# Patient Record
Sex: FEMALE | Race: WHITE | Employment: FULL TIME | URBAN - METROPOLITAN AREA
[De-identification: names, ages, dates, MRNs, and addresses within clinical notes are randomized per-mention and may not be internally consistent; named-entity substitution may affect disease eponyms.]

---

## 2020-08-31 ENCOUNTER — OFFICE VISIT (OUTPATIENT)
Dept: CARDIOLOGY CLINIC | Age: 60
End: 2020-08-31
Payer: COMMERCIAL

## 2020-08-31 VITALS
WEIGHT: 158 LBS | DIASTOLIC BLOOD PRESSURE: 72 MMHG | HEART RATE: 72 BPM | HEIGHT: 63 IN | SYSTOLIC BLOOD PRESSURE: 128 MMHG | BODY MASS INDEX: 28 KG/M2

## 2020-08-31 DIAGNOSIS — Z72.0 TOBACCO USE: ICD-10-CM

## 2020-08-31 DIAGNOSIS — R00.2 HEART PALPITATIONS: Primary | ICD-10-CM

## 2020-08-31 DIAGNOSIS — E78.2 MIXED HYPERLIPIDEMIA: ICD-10-CM

## 2020-08-31 DIAGNOSIS — Z82.49 FAMILY HISTORY OF EARLY CAD: ICD-10-CM

## 2020-08-31 PROCEDURE — 99204 OFFICE O/P NEW MOD 45 MIN: CPT | Performed by: INTERNAL MEDICINE

## 2020-08-31 RX ORDER — LISINOPRIL AND HYDROCHLOROTHIAZIDE 20; 25 MG/1; MG/1
TABLET ORAL
COMMUNITY
Start: 2020-08-15 | End: 2021-01-27

## 2020-08-31 RX ORDER — VITAMIN E 268 MG
CAPSULE ORAL DAILY
COMMUNITY
End: 2021-03-03

## 2020-08-31 RX ORDER — DIAPER,BRIEF,INFANT-TODD,DISP
EACH MISCELLANEOUS
COMMUNITY

## 2020-08-31 RX ORDER — ATORVASTATIN CALCIUM 40 MG/1
TABLET, FILM COATED ORAL
COMMUNITY
Start: 2020-08-15

## 2020-08-31 NOTE — PATIENT INSTRUCTIONS
A 30 day loop monitor has been ordered for you. This will be mailed to the address given to us. Please read over the instructions given to you about [employer] loop monitors. If you have any insurance questions regarding coverage and out of pocket cost please contact the number below. If you have any billing questions regarding deductible or responsibility call ([phone]) If you need additional supplies or issue with your monitor please call ([phone])

## 2020-08-31 NOTE — PROGRESS NOTES
[address]: [place]  [phone]    History of Present Illness:  Ms. Lauren Cameron is a 63 yo F with a history of tobacco use, family history of early coronary artery disease, toxemia with pregnancy, referred by Dr. July Roman for cardiac evaluation. She is here due to recent palpitations. Initially about a month ago they were occurring almost daily lasting for minutes to hours at a time, occurring with no particular triggers. She denies any exertional chest pain. She does have occasional aching in her left arm, but this is nonexertional.  Otherwise, breathing has been stable as well. She does note that the palpitations have become less frequent, now just occurring a few times a week and more brief. Last occurred while watching tv. She is compensated on exam.  Soc hx. Tobacco 1/2 ppd. Chantix in past.  Fam hx. GF paternal 54 CAD, father CABG/MI. Assessment and Plan:    1. Palpitations. Will obtain a one month loop monitor and echocardiogram for further evaluation. 2. Family history of early coronary artery disease. 3. Mixed hyperlipidemia. 4. Tobacco use. Encouraged cessation. She has had some success with Chantix in the past, but also had a reaction when she was taking this concurrent to an antibiotic. 5. Mixed hyperlipidemia. Tolerating statin. She  has a past medical history of Hyperlipemia, Hypertension, and Preeclampsia. All other systems negative except as above. PE  Vitals:    08/31/20 0856   BP: 128/72   Pulse: 72   Weight: 158 lb (71.7 kg)   Height: 5' 3\" (1.6 m)    Body mass index is 27.99 kg/m².    General appearance - alert, well appearing, and in no distress  Mental status - affect appropriate to mood  Eyes - sclera anicteric, moist mucous membranes  Neck - supple, no JVD  Chest - clear to auscultation, no wheezes, rales or rhonchi  Heart - normal rate, regular rhythm, normal S1, S2, no murmurs, rubs, clicks or gallops  Abdomen - soft, nontender, nondistended, no masses or organomegaly  Neurological no focal deficit  Extremities - peripheral pulses normal, no pedal edema      Recent Labs:  No results found for: CHOL, CHOLX, CHLST, CHOLV, 910433, HDL, HDLP, LDL, LDLC, DLDLP, TGLX, TRIGL, TRIGP, CHHD, CHHDX  No results found for: TYLER, CREAPOC, ACREA, CREA, REFC3, REFC4  No results found for: BUN, BUNPOC, IBUN, MBUNV, BUNV  No results found for: K, KI, PLK, WBK  No results found for: HBA1C, HGBE8, XDD7NNZV, SJZ1XXXM  No results found for: HGBPOC, HGB, HGBP, HGBEXT, HGBEXT  No results found for: PLT, PLTEXT, PLTEXT    Reviewed:  Past Medical History:   Diagnosis Date    Hyperlipemia     Hypertension     Preeclampsia      Social History     Tobacco Use   Smoking Status Former Smoker   Smokeless Tobacco Never Used     Social History     Substance and Sexual Activity   Alcohol Use Yes    Alcohol/week: 3.3 standard drinks    Types: 4 Glasses of wine per week     Allergies   Allergen Reactions    Azithromycin Hives and Rash    Zocor [Simvastatin] Other (comments)     Heart palpitations       Current Outpatient Medications   Medication Sig    lisinopril-hydroCHLOROthiazide (PRINZIDE, ZESTORETIC) 20-25 mg per tablet TAKE 1 TABLET BY MOUTH EVERY DAY    atorvastatin (LIPITOR) 40 mg tablet TAKE 1 TABLET BY MOUTH EVERY DAY    biotin 10,000 mcg cap Take  by mouth.  vitamin E (AQUA GEMS) 400 unit capsule Take  by mouth daily.  cholecalciferol, vitamin D3, (VITAMIN D3 PO) Take  by mouth.  ibuprofen (MOTRIN) 600 mg tablet Take 1 Tab by mouth every six (6) hours as needed for Pain.  ATORVASTATIN CALCIUM (LIPITOR PO) Take  by mouth.  LISINOPRIL PO Take  by mouth.  HYDROcodone-acetaminophen (NORCO) 7.5-325 mg per tablet Take 1 Tab by mouth every six (6) hours as needed for Pain. No current facility-administered medications for this visit.         Lary Trinh MD  [facility] heart and Vascular [place]  [address], [address] [facility] [address]  [facility], [address]

## 2020-08-31 NOTE — LETTER
9/1/2020 5:37 PM 
 
Patient:  Anca Barrios YOB: 1960 Date of Visit: 8/31/2020 Dear Stephanie Lopez MD 
[address] [place] [id number] VIA Facsimile: [phone]: Thank you for referring Ms. Anca Barrios to me for evaluation/treatment. Below are the relevant portions of my assessment and plan of care. Ms. Dion Srpinger is a 63 yo F with a history of tobacco use, family history of early coronary artery disease, toxemia with pregnancy, referred by Dr. Jhon Lentz for cardiac evaluation. She is here due to recent palpitations. Initially about a month ago they were occurring almost daily lasting for minutes to hours at a time, occurring with no particular triggers. She denies any exertional chest pain. She does have occasional aching in her left arm, but this is nonexertional.  Otherwise, breathing has been stable as well. She does note that the palpitations have become less frequent, now just occurring a few times a week and more brief. Last occurred while watching tv. She is compensated on exam. 
Soc hx. Tobacco 1/2 ppd. Chantix in past. 
Fam hx. GF paternal 54 CAD, father CABG/MI. Assessment and Plan: 1. Palpitations. Will obtain a one month loop monitor and echocardiogram for further evaluation. 2. Family history of early coronary artery disease. 3. Mixed hyperlipidemia. 4. Tobacco use. Encouraged cessation. She has had some success with Chantix in the past, but also had a reaction when she was taking this concurrent to an antibiotic. 5. Mixed hyperlipidemia. Tolerating statin. If you have questions, please do not hesitate to call me. Sincerely, Anum Perea MD

## 2020-09-18 ENCOUNTER — ANCILLARY PROCEDURE (OUTPATIENT)
Dept: CARDIOLOGY CLINIC | Age: 60
End: 2020-09-18
Payer: COMMERCIAL

## 2020-09-18 VITALS
SYSTOLIC BLOOD PRESSURE: 128 MMHG | HEIGHT: 63 IN | BODY MASS INDEX: 28 KG/M2 | DIASTOLIC BLOOD PRESSURE: 72 MMHG | WEIGHT: 158 LBS

## 2020-09-18 DIAGNOSIS — R00.2 HEART PALPITATIONS: ICD-10-CM

## 2020-09-18 DIAGNOSIS — Z82.49 FAMILY HISTORY OF EARLY CAD: ICD-10-CM

## 2020-09-18 DIAGNOSIS — Z72.0 TOBACCO USE: ICD-10-CM

## 2020-09-18 DIAGNOSIS — E78.2 MIXED HYPERLIPIDEMIA: ICD-10-CM

## 2020-09-18 LAB
ECHO AO ASC DIAM: 3.21 CM
ECHO AO ROOT DIAM: 2.63 CM
ECHO AV AREA PEAK VELOCITY: 1.89 CM2
ECHO AV AREA VTI: 2 CM2
ECHO AV AREA/BSA PEAK VELOCITY: 1.1 CM2/M2
ECHO AV AREA/BSA VTI: 1.1 CM2/M2
ECHO AV MEAN GRADIENT: 6.96 MMHG
ECHO AV MEAN VELOCITY: 123.15 CM/S
ECHO AV PEAK GRADIENT: 13.73 MMHG
ECHO AV PEAK VELOCITY: 185.27 CM/S
ECHO AV VTI: 36.21 CM
ECHO IVC PROX: 1.07 CM
ECHO LA AREA 4C: 13.58 CM2
ECHO LA MAJOR AXIS: 3.3 CM
ECHO LA MINOR AXIS: 1.89 CM
ECHO LA VOL 2C: 38.97 ML (ref 22–52)
ECHO LA VOL 4C: 32.43 ML (ref 22–52)
ECHO LA VOL BP: 39.16 ML (ref 22–52)
ECHO LA VOL/BSA BIPLANE: 22.38 ML/M2 (ref 16–28)
ECHO LA VOLUME INDEX A2C: 22.28 ML/M2 (ref 16–28)
ECHO LA VOLUME INDEX A4C: 18.54 ML/M2 (ref 16–28)
ECHO LV E' LATERAL VELOCITY: 9.42 CM/S
ECHO LV E' SEPTAL VELOCITY: 8.02 CM/S
ECHO LV EDV A2C: 91.83 ML
ECHO LV EDV A4C: 72.4 ML
ECHO LV EDV BP: 83.35 ML (ref 56–104)
ECHO LV EDV INDEX A4C: 41.4 ML/M2
ECHO LV EDV INDEX BP: 47.6 ML/M2
ECHO LV EDV NDEX A2C: 52.5 ML/M2
ECHO LV EJECTION FRACTION A2C: 70 PERCENT
ECHO LV EJECTION FRACTION A4C: 55 PERCENT
ECHO LV EJECTION FRACTION BIPLANE: 64 PERCENT (ref 55–100)
ECHO LV ESV A2C: 27.43 ML
ECHO LV ESV A4C: 32.74 ML
ECHO LV ESV BP: 30.05 ML (ref 19–49)
ECHO LV ESV INDEX A2C: 15.7 ML/M2
ECHO LV ESV INDEX A4C: 18.7 ML/M2
ECHO LV ESV INDEX BP: 17.2 ML/M2
ECHO LV INTERNAL DIMENSION DIASTOLIC: 3.36 CM (ref 3.9–5.3)
ECHO LV INTERNAL DIMENSION SYSTOLIC: 2.41 CM
ECHO LV IVSD: 1.03 CM (ref 0.6–0.9)
ECHO LV MASS 2D: 95.7 G (ref 67–162)
ECHO LV MASS INDEX 2D: 54.7 G/M2 (ref 43–95)
ECHO LV POSTERIOR WALL DIASTOLIC: 0.95 CM (ref 0.6–0.9)
ECHO LVOT DIAM: 1.95 CM
ECHO LVOT PEAK GRADIENT: 5.53 MMHG
ECHO LVOT PEAK VELOCITY: 117.54 CM/S
ECHO LVOT SV: 72.6 ML
ECHO LVOT VTI: 24.4 CM
ECHO MV A VELOCITY: 88.31 CM/S
ECHO MV AREA PHT: 4.46 CM2
ECHO MV E DECELERATION TIME (DT): 0.17 S
ECHO MV E VELOCITY: 100.19 CM/S
ECHO MV E/A RATIO: 1.13
ECHO MV E/E' LATERAL: 10.64
ECHO MV E/E' RATIO (AVERAGED): 11.56
ECHO MV E/E' SEPTAL: 12.49
ECHO MV PRESSURE HALF TIME (PHT): 0.05 S
ECHO RA MAJOR AXIS: 2.89 CM
ECHO RV INTERNAL DIMENSION: 2.71 CM
ECHO RV TAPSE: 1.7 CM (ref 1.5–2)
LA VOL DISK BP: 36.86 ML (ref 22–52)
LVOT MG: 2.76 MMHG

## 2020-09-18 PROCEDURE — 93306 TTE W/DOPPLER COMPLETE: CPT | Performed by: INTERNAL MEDICINE

## 2020-10-07 ENCOUNTER — TELEPHONE (OUTPATIENT)
Dept: CARDIOLOGY CLINIC | Age: 60
End: 2020-10-07

## 2020-10-07 NOTE — TELEPHONE ENCOUNTER
Patient returned called to patient. Two patient indentifiers verified. Pt was given results. Pt stated she doesn't want to start a medication at this time. Pt will call back if symptoms increases and wants to start medications. Pt verbalized understanding and denies any further questions at this time.

## 2020-10-07 NOTE — TELEPHONE ENCOUNTER
----- Message from Awilda Reese MD sent at 10/7/2020  8:39 AM EDT -----  Please let pt know loop demonstrated fast heart beats (SVT) associated with her palpitations. These were brief last a few seconds. No therapy is needed for this. Can take toprol 25 mg once daily for symptom relief. If she does start toprol, follow up in 1 month after.  thx

## 2021-01-04 ENCOUNTER — TELEPHONE (OUTPATIENT)
Dept: CARDIOLOGY CLINIC | Age: 61
End: 2021-01-04

## 2021-01-04 RX ORDER — METOPROLOL SUCCINATE 25 MG/1
25 TABLET, EXTENDED RELEASE ORAL DAILY
Qty: 30 TAB | Refills: 1 | Status: SHIPPED | OUTPATIENT
Start: 2021-01-04 | End: 2021-01-27 | Stop reason: SDUPTHER

## 2021-01-04 NOTE — TELEPHONE ENCOUNTER
Patient calling in regards to heart palpitations. States she was told to call once they have increased. States shortness of breath and chest tightness have been waking her up at night and have increased.     Phone: [phone]

## 2021-01-04 NOTE — TELEPHONE ENCOUNTER
Returned call to patient. Two patient indentifiers verified. Per notw on 10/7/2020 patient doesn't wasn't to start medications. Pt still having palps will start toprol 25 mg daily and follow up in 1 month. Pt verbalized understanding and denies any further questions at this time.      Future Appointments   Date Time Provider Maryam Cristobal   1/27/2021 10:40 AM MD EMELINA Arevalo AMB

## 2021-01-27 ENCOUNTER — OFFICE VISIT (OUTPATIENT)
Dept: CARDIOLOGY CLINIC | Age: 61
End: 2021-01-27
Payer: COMMERCIAL

## 2021-01-27 VITALS
HEART RATE: 67 BPM | BODY MASS INDEX: 29.77 KG/M2 | SYSTOLIC BLOOD PRESSURE: 130 MMHG | RESPIRATION RATE: 18 BRPM | DIASTOLIC BLOOD PRESSURE: 70 MMHG | HEIGHT: 63 IN | OXYGEN SATURATION: 97 % | WEIGHT: 168 LBS

## 2021-01-27 DIAGNOSIS — Z82.49 FAMILY HISTORY OF EARLY CAD: ICD-10-CM

## 2021-01-27 DIAGNOSIS — E78.2 MIXED HYPERLIPIDEMIA: ICD-10-CM

## 2021-01-27 DIAGNOSIS — I47.1 PAROXYSMAL SVT (SUPRAVENTRICULAR TACHYCARDIA) (HCC): Primary | ICD-10-CM

## 2021-01-27 DIAGNOSIS — Z72.0 TOBACCO USE: ICD-10-CM

## 2021-01-27 PROCEDURE — 93000 ELECTROCARDIOGRAM COMPLETE: CPT | Performed by: INTERNAL MEDICINE

## 2021-01-27 PROCEDURE — 99214 OFFICE O/P EST MOD 30 MIN: CPT | Performed by: INTERNAL MEDICINE

## 2021-01-27 RX ORDER — DILTIAZEM HYDROCHLORIDE 120 MG/1
120 CAPSULE, COATED, EXTENDED RELEASE ORAL DAILY
Qty: 30 CAP | Refills: 5 | Status: SHIPPED | OUTPATIENT
Start: 2021-01-27 | End: 2021-03-03

## 2021-01-27 RX ORDER — METOPROLOL SUCCINATE 25 MG/1
25 TABLET, EXTENDED RELEASE ORAL DAILY
Qty: 90 TAB | Refills: 1 | Status: SHIPPED | OUTPATIENT
Start: 2021-01-27 | End: 2021-03-03

## 2021-01-27 NOTE — PROGRESS NOTES
[address]: [place]  [phone]    History of Present Illness:  Ms. Alaina Easton is a 63 yo F with a history of tobacco use, family history of early coronary artery disease, toxemia with pregnancy. Echo was normal. Loop demonstrated brief episodes of SVT. On her last visit due to palpitations, had her wear a loop monitor that demonstrated episodes of SVT. Initially, she tried to hold off on medication, but continued to have palpitations and started Toprol 25 mg a few weeks ago. She notes that she continues to have almost daily palpitations and did not seem to help very much. When she is not having these episodes, her breathing has been stable and no chest pain. Often times, she will wake up with the palpitations, sometimes after eating. She also had an echocardiogram that was overall normal and we discussed this. She is compensated on exam with clear lungs and no lower extremity edema. Blood pressure is 130/70 and heart rate of 67. Soc hx. Tobacco 1/2 ppd. Chantix in past.  Fam hx. GF paternal 54 CAD, father CABG/MI. Assessment and Plan:    1. Paroxysmal supraventricular tachycardia. She is having frequent symptoms and will replace Lisinopril with Diltiazem. She will continue with Toprol. Will have her follow back in one month. We did talk briefly about possibly ablation if this was unable to be controlled with medication. Her EKG today is normal sinus rhythm with single PVC. 2. Mixed hyperlipidemia. Tolerating statin. 3. Family history of early coronary artery disease. 4. Tobacco use. Encouraged cessation. She  has a past medical history of Hyperlipemia, Hypertension, and Preeclampsia. All other systems negative except as above. PE  Vitals:    01/27/21 1115   BP: 130/70   Pulse: 67   Resp: 18   SpO2: 97%   Weight: 168 lb (76.2 kg)   Height: 5' 3\" (1.6 m)    Body mass index is 29.76 kg/m².    General appearance - alert, well appearing, and in no distress  Mental status - affect appropriate to mood  Eyes - sclera anicteric, moist mucous membranes  Neck - supple, no JVD  Chest - clear to auscultation, no wheezes, rales or rhonchi  Heart - normal rate, regular rhythm, normal S1, S2, no murmurs, rubs, clicks or gallops  Abdomen - soft, nontender, nondistended, no masses or organomegaly  Neurological no focal deficit  Extremities - peripheral pulses normal, no pedal edema      Recent Labs:  No results found for: CHOL, CHOLX, CHLST, CHOLV, [id number], HDL, HDLP, LDL, LDLC, DLDLP, TGLX, TRIGL, TRIGP, CHHD, CHHDX  No results found for: TYLER, CREAPOC, [facility], CREA, REFC3, REFC4  No results found for: BUN, BUNPOC, IBUN, MBUNV, BUNV  No results found for: K, KI, PLK, WBK  No results found for: HBA1C, HGBE8, TUB0NRZA, HBY6WIMX  No results found for: HGBPOC, HGB, HGBP, HGBEXT, HGBEXT  No results found for: PLT, PLTEXT, PLTEXT    Reviewed:  Past Medical History:   Diagnosis Date    Hyperlipemia     Hypertension     Preeclampsia      Social History     Tobacco Use   Smoking Status Former Smoker   Smokeless Tobacco Never Used     Social History     Substance and Sexual Activity   Alcohol Use Yes    Alcohol/week: 3.3 standard drinks    Types: 4 Glasses of wine per week     Allergies   Allergen Reactions    Azithromycin Hives and Rash    Zocor [Simvastatin] Other (comments)     Heart palpitations       Current Outpatient Medications   Medication Sig    metoprolol succinate (TOPROL-XL) 25 mg XL tablet Take 1 Tab by mouth daily.  lisinopril-hydroCHLOROthiazide (PRINZIDE, ZESTORETIC) 20-25 mg per tablet TAKE 1 TABLET BY MOUTH EVERY DAY    atorvastatin (LIPITOR) 40 mg tablet TAKE 1 TABLET BY MOUTH EVERY DAY    biotin 10,000 mcg cap Take  by mouth.  vitamin E (AQUA GEMS) 400 unit capsule Take  by mouth daily.  cholecalciferol, vitamin D3, (VITAMIN D3 PO) Take  by mouth.  ATORVASTATIN CALCIUM (LIPITOR PO) Take  by mouth.  LISINOPRIL PO Take  by mouth.       HYDROcodone-acetaminophen (NORCO) 7.5-325 mg per tablet Take 1 Tab by mouth every six (6) hours as needed for Pain.  ibuprofen (MOTRIN) 600 mg tablet Take 1 Tab by mouth every six (6) hours as needed for Pain. No current facility-administered medications for this visit.         Sherri Todd MD  [facility] heart and Vascular [place]  [address], [address] [facility] [address]  [facility], [address]

## 2021-01-27 NOTE — LETTER
Patient:  Mustapha Veloz YOB: 1960 Date of Visit: 1/27/2021 Dear Richar Mota MD 
[address] [place] [phone] Via Fax: [phone]: 
 
 
Ms. Kavitha Che is a 65 yo F with a history of tobacco use, family history of early coronary artery disease, toxemia with pregnancy. Echo was normal. Loop demonstrated brief episodes of SVT. On her last visit due to palpitations, had her wear a loop monitor that demonstrated episodes of SVT. Initially, she tried to hold off on medication, but continued to have palpitations and started Toprol 25 mg a few weeks ago. She notes that she continues to have almost daily palpitations and did not seem to help very much. When she is not having these episodes, her breathing has been stable and no chest pain. Often times, she will wake up with the palpitations, sometimes after eating. She also had an echocardiogram that was overall normal and we discussed this. She is compensated on exam with clear lungs and no lower extremity edema. Blood pressure is 130/70 and heart rate of 67. Soc hx. Tobacco 1/2 ppd. Chantix in past. 
Fam hx. GF paternal 54 CAD, father CABG/MI. Assessment and Plan: 1. Paroxysmal supraventricular tachycardia. She is having frequent symptoms and will replace Lisinopril with Diltiazem. She will continue with Toprol. Will have her follow back in one month. We did talk briefly about possibly ablation if this was unable to be controlled with medication. Her EKG today is normal sinus rhythm with single PVC. 2. Mixed hyperlipidemia. Tolerating statin. 3. Family history of early coronary artery disease. 4. Tobacco use. Encouraged cessation. If you have questions, please do not hesitate to call me. Sincerely, Diogenes Westfall MD

## 2021-02-01 ENCOUNTER — TELEPHONE (OUTPATIENT)
Dept: CARDIOLOGY CLINIC | Age: 61
End: 2021-02-01

## 2021-02-01 DIAGNOSIS — Z79.899 MEDICATION INCREASED: ICD-10-CM

## 2021-02-01 DIAGNOSIS — Z82.49 FAMILY HISTORY OF EARLY CAD: Primary | ICD-10-CM

## 2021-02-01 DIAGNOSIS — R00.2 HEART PALPITATIONS: ICD-10-CM

## 2021-02-01 NOTE — TELEPHONE ENCOUNTER
Patient calling to speak with Lucy Taylor about her meds and it's side effects    She can be reached at [phone]    [facility]

## 2021-02-01 NOTE — TELEPHONE ENCOUNTER
Returned call to patient. Two patient indentifiers verified. Pt stated that since she has stopped lisinopirl-HCTZ and starting diltiazem she has increased SOB, and weight gain of 6 lbs since last Wednesday. Pt stated that she is out of town till Thursday. Patient unsure if she should continue to take diltiazem.

## 2021-02-02 RX ORDER — FUROSEMIDE 40 MG/1
40 TABLET ORAL DAILY
Qty: 30 TAB | Refills: 0 | Status: SHIPPED | OUTPATIENT
Start: 2021-02-02 | End: 2021-03-01

## 2021-02-02 NOTE — TELEPHONE ENCOUNTER
Returned call to patient. Two patient indentifiers verified. Pt was informed of the message. Pt is out of town at this time. She will let me know which pharmacy to send in las. Pt verbalized understanding and denies any further questions at this time.

## 2021-02-02 NOTE — TELEPHONE ENCOUNTER
MD Amanda Rhodes, RN   Caller: Unspecified (Yesterday,  3:06 PM)             Continue diltiazem, start lasix 40 mg once daily for wt gain. Check chem 7 in 1 wk. Explain to her wt gain may have been coming off the hctz.  thx

## 2021-03-01 RX ORDER — FUROSEMIDE 40 MG/1
TABLET ORAL
Qty: 90 TAB | Refills: 3 | Status: SHIPPED | OUTPATIENT
Start: 2021-03-01

## 2021-03-01 RX ORDER — FUROSEMIDE 40 MG/1
TABLET ORAL
Qty: 30 TAB | Refills: 5 | Status: SHIPPED | OUTPATIENT
Start: 2021-03-01 | End: 2021-03-01 | Stop reason: SDUPTHER

## 2021-03-01 NOTE — TELEPHONE ENCOUNTER
Requested Prescriptions     Signed Prescriptions Disp Refills    furosemide (LASIX) 40 mg tablet 90 Tab 3     Sig: TAKE 1 TABLET BY MOUTH EVERY DAY     Authorizing Provider: Aliya Ríos     Ordering User: Elieser Martinez       Update to 90 day supply Per Dr. Estela Correa   Date Time Provider Maryam Cristobal   3/3/2021 10:40 AM MD EMELINA Chauhan AMB

## 2021-03-01 NOTE — TELEPHONE ENCOUNTER
Requested Prescriptions     Signed Prescriptions Disp Refills    furosemide (LASIX) 40 mg tablet 30 Tab 5     Sig: TAKE 1 TABLET BY MOUTH EVERY DAY     Authorizing Provider: Marina Voss     Ordering User: Katerina Bledsoe       Last office visit 1/27/21    Per Dr. Jeong [facility]   Date Time Provider Maryam Cristobal   3/3/2021 10:40 AM MD EMELINA Chandler AMB

## 2021-03-03 ENCOUNTER — OFFICE VISIT (OUTPATIENT)
Dept: CARDIOLOGY CLINIC | Age: 61
End: 2021-03-03
Payer: COMMERCIAL

## 2021-03-03 VITALS
RESPIRATION RATE: 18 BRPM | WEIGHT: 170 LBS | SYSTOLIC BLOOD PRESSURE: 144 MMHG | BODY MASS INDEX: 31.28 KG/M2 | OXYGEN SATURATION: 95 % | HEART RATE: 68 BPM | HEIGHT: 62 IN | DIASTOLIC BLOOD PRESSURE: 90 MMHG

## 2021-03-03 DIAGNOSIS — R00.2 HEART PALPITATIONS: ICD-10-CM

## 2021-03-03 DIAGNOSIS — E78.2 MIXED HYPERLIPIDEMIA: ICD-10-CM

## 2021-03-03 DIAGNOSIS — Z72.0 TOBACCO USE: ICD-10-CM

## 2021-03-03 DIAGNOSIS — I47.1 PAROXYSMAL SVT (SUPRAVENTRICULAR TACHYCARDIA) (HCC): Primary | ICD-10-CM

## 2021-03-03 PROCEDURE — 99214 OFFICE O/P EST MOD 30 MIN: CPT | Performed by: INTERNAL MEDICINE

## 2021-03-03 RX ORDER — METOPROLOL SUCCINATE 50 MG/1
50 TABLET, EXTENDED RELEASE ORAL DAILY
Qty: 90 TAB | Refills: 1 | Status: SHIPPED | OUTPATIENT
Start: 2021-03-03 | End: 2021-03-10

## 2021-03-03 NOTE — LETTER
Patient:  Denise Shepard YOB: 1960 Date of Visit: 3/3/2021 Dear Janeen Lucia MD 
[address] [place] [phone] Via Fax: [phone]: 
 
 
Ms. Denny Miller is a 63 yo F with a history of tobacco use, family history of early coronary artery disease, toxemia with pregnancy. Echo was normal. Loop demonstrated brief episodes of SVT. n her last visit due to paroxysmal supraventricular tachycardia, replaced Lisinopril with Diltiazem and continued Toprol. She developed increased lower extremity edema because she was previously on Hydrochlorothiazide, as well as weight gain. Had her start Lasix and removed the Diltiazem and doubled her Toprol. She thinks that 85% of her symptoms have gotten better, though she still has occasional palpitations, mostly in the evening. No associated lightheadedness. She has been a little more short of breath with exertion, but has been less active this last month or two. She is going to try to get back into aerobics and I think this is a good idea. No chest pains. She is compensated on exam with clear lungs and no lower extremity edema. Assessment and Plan: 1. Paroxysmal supraventricular tachycardia. Stable on Toprol. Her blood pressure was borderline elevated today and if it remains elevated would increase her Toprol further. She did have swelling and weight gain with Diltiazem, but this may also have been being off the Hydrochlorothiazide. For now, will have her continue the Lasix. Will have her follow back in six months. 2. Mixed hyperlipidemia. Tolerating statin. 3. Family history of early coronary artery disease. 4. Tobacco use. Encouraged cessation. If you have questions, please do not hesitate to call me. Sincerely, Vickie Ferguson MD

## 2021-03-03 NOTE — PROGRESS NOTES
[address]: [place]  [phone]    History of Present Illness:  Ms. Kika Kumari is a 65 yo F with a history of tobacco use, family history of early coronary artery disease, toxemia with pregnancy. Echo was normal. Loop demonstrated brief episodes of SVT. n her last visit due to paroxysmal supraventricular tachycardia, replaced Lisinopril with Diltiazem and continued Toprol. She developed increased lower extremity edema because she was previously on Hydrochlorothiazide, as well as weight gain. Had her start Lasix and removed the Diltiazem and doubled her Toprol. She thinks that 85% of her symptoms have gotten better, though she still has occasional palpitations, mostly in the evening. No associated lightheadedness. She has been a little more short of breath with exertion, but has been less active this last month or two. She is going to try to get back into aerobics and I think this is a good idea. No chest pains. She is compensated on exam with clear lungs and no lower extremity edema. Assessment and Plan:    1. Paroxysmal supraventricular tachycardia. Stable on Toprol. Her blood pressure was borderline elevated today and if it remains elevated would increase her Toprol further. She did have swelling and weight gain with Diltiazem, but this may also have been being off the Hydrochlorothiazide. For now, will have her continue the Lasix. Will have her follow back in six months. 2. Mixed hyperlipidemia. Tolerating statin. 3. Family history of early coronary artery disease. 4. Tobacco use. Encouraged cessation. She  has a past medical history of Hyperlipemia, Hypertension, and Preeclampsia. All other systems negative except as above. PE  Vitals:    03/03/21 1050   BP: (!) 144/90   Pulse: 68   Resp: 18   SpO2: 95%   Weight: 170 lb (77.1 kg)   Height: 5' 2\" (1.575 m)    Body mass index is 31.09 kg/m².    General appearance - alert, well appearing, and in no distress  Mental status - affect appropriate to mood  Eyes - sclera anicteric, moist mucous membranes  Neck - supple, no JVD  Chest - clear to auscultation, no wheezes, rales or rhonchi  Heart - normal rate, regular rhythm, normal S1, S2, no murmurs, rubs, clicks or gallops  Abdomen - soft, nontender, nondistended, no masses or organomegaly  Neurological no focal deficit  Extremities - peripheral pulses normal, no pedal edema      Recent Labs:  No results found for: CHOL, CHOLX, CHLST, CHOLV, 742321, HDL, HDLP, LDL, LDLC, DLDLP, TGLX, TRIGL, TRIGP, CHHD, CHHDX  Lab Results   Component Value Date/Time    Creatinine 0.87 02/08/2021 08:20 AM     Lab Results   Component Value Date/Time    BUN 13 02/08/2021 08:20 AM     Lab Results   Component Value Date/Time    Potassium 3.9 02/08/2021 08:20 AM     No results found for: HBA1C, HGBE8, JSI2LDRK, VRX6AFLX  No results found for: HGBPOC, HGB, HGBP, HGBEXT, HGBEXT  No results found for: PLT, PLTEXT, PLTEXT    Reviewed:  Past Medical History:   Diagnosis Date    Hyperlipemia     Hypertension     Preeclampsia      Social History     Tobacco Use   Smoking Status Former Smoker   Smokeless Tobacco Never Used     Social History     Substance and Sexual Activity   Alcohol Use Yes    Alcohol/week: 3.3 standard drinks    Types: 4 Glasses of wine per week     Allergies   Allergen Reactions    Azithromycin Hives and Rash    Zocor [Simvastatin] Other (comments)     Heart palpitations       Current Outpatient Medications   Medication Sig    furosemide (LASIX) 40 mg tablet TAKE 1 TABLET BY MOUTH EVERY DAY    metoprolol succinate (TOPROL-XL) 25 mg XL tablet Take 1 Tab by mouth daily.  atorvastatin (LIPITOR) 40 mg tablet TAKE 1 TABLET BY MOUTH EVERY DAY    biotin 10,000 mcg cap Take  by mouth.  cholecalciferol, vitamin D3, (VITAMIN D3 PO) Take  by mouth.  ATORVASTATIN CALCIUM (LIPITOR PO) Take  by mouth.  dilTIAZem ER (CARDIZEM CD) 120 mg capsule Take 1 Cap by mouth daily.     vitamin E (AQUA GEMS) 400 unit capsule Take  by mouth daily.  LISINOPRIL PO Take  by mouth.  HYDROcodone-acetaminophen (NORCO) 7.5-325 mg per tablet Take 1 Tab by mouth every six (6) hours as needed for Pain.  ibuprofen (MOTRIN) 600 mg tablet Take 1 Tab by mouth every six (6) hours as needed for Pain. No current facility-administered medications for this visit.         Zafar Velarde MD  [facility] heart and Vascular [place]  [address] [facility] [address]  [address]

## 2021-03-10 RX ORDER — METOPROLOL SUCCINATE 100 MG/1
100 TABLET, EXTENDED RELEASE ORAL DAILY
Qty: 90 TAB | Refills: 1
Start: 2021-03-10 | End: 2021-03-24

## 2021-03-24 RX ORDER — METOPROLOL SUCCINATE 100 MG/1
150 TABLET, EXTENDED RELEASE ORAL DAILY
Qty: 90 TAB | Refills: 1
Start: 2021-03-24 | End: 2021-04-16 | Stop reason: SDUPTHER

## 2021-03-24 RX ORDER — DILTIAZEM HYDROCHLORIDE 120 MG/1
120 CAPSULE, COATED, EXTENDED RELEASE ORAL DAILY
Qty: 30 CAP | Refills: 5 | Status: SHIPPED | OUTPATIENT
Start: 2021-03-24 | End: 2021-03-24

## 2021-03-24 NOTE — PROGRESS NOTES
Requested Prescriptions     Signed Prescriptions Disp Refills    dilTIAZem ER (CARDIZEM CD) 120 mg capsule 30 Cap 5     Sig: Take 1 Cap by mouth daily.        Per Dr. Yina Whitlock   Date Time Provider Maryam Cristobal   9/3/2021  9:40 AM Dulce Claude, MD [facility] AMB

## 2021-04-16 RX ORDER — METOPROLOL SUCCINATE 100 MG/1
150 TABLET, EXTENDED RELEASE ORAL DAILY
Qty: 135 TAB | Refills: 1 | Status: SHIPPED | OUTPATIENT
Start: 2021-04-16 | End: 2021-11-05 | Stop reason: SDUPTHER

## 2021-04-16 NOTE — TELEPHONE ENCOUNTER
Requested Prescriptions     Signed Prescriptions Disp Refills    metoprolol succinate (TOPROL-XL) 100 mg tablet 135 Tab 1     Sig: Take 1.5 Tabs by mouth daily.      Authorizing Provider: Horace Mace     Ordering User: Torrie Mariee       Per Dr. Alban Cardenas   Date Time Provider Maryam Cristobal   9/3/2021  9:40 AM MD EMELINA Friedman AMB

## 2021-09-16 ENCOUNTER — OFFICE VISIT (OUTPATIENT)
Dept: CARDIOLOGY CLINIC | Age: 61
End: 2021-09-16
Payer: COMMERCIAL

## 2021-09-16 VITALS
OXYGEN SATURATION: 96 % | DIASTOLIC BLOOD PRESSURE: 80 MMHG | BODY MASS INDEX: 32.2 KG/M2 | RESPIRATION RATE: 16 BRPM | WEIGHT: 175 LBS | HEART RATE: 72 BPM | SYSTOLIC BLOOD PRESSURE: 120 MMHG | HEIGHT: 62 IN

## 2021-09-16 DIAGNOSIS — I47.1 PAROXYSMAL SVT (SUPRAVENTRICULAR TACHYCARDIA) (HCC): ICD-10-CM

## 2021-09-16 DIAGNOSIS — E78.2 MIXED HYPERLIPIDEMIA: ICD-10-CM

## 2021-09-16 DIAGNOSIS — I20.0 UNSTABLE ANGINA (HCC): Primary | ICD-10-CM

## 2021-09-16 DIAGNOSIS — Z72.0 TOBACCO USE: ICD-10-CM

## 2021-09-16 DIAGNOSIS — Z82.49 FAMILY HISTORY OF EARLY CAD: ICD-10-CM

## 2021-09-16 PROCEDURE — 99214 OFFICE O/P EST MOD 30 MIN: CPT | Performed by: INTERNAL MEDICINE

## 2021-09-16 NOTE — PATIENT INSTRUCTIONS
You have been scheduled for an exercise nuclear stress test. We will send results via [email or web address] and see you back in 6 months. Please arrive 15 minutes prior to your appointment. Wear comfortable clothing and walking or athletic shoes. Do not eat or drink anything, except water, for at least 4 hours prior to your appointment. Avoid tobacco products for at least 12 hours prior to your test.    Do not eat or drink anything containing caffeine, including but not limited to the following: chocolate, regular and decaffeinated coffee, soft drinks, or tea for at least 24 hours prior to your test.    Do not hold your scheduled medications prior to your test.    Your test will be performed on a 1 day protocol. This is determined by your height, weight, and other risk factors. For a 2 day test, please allow for 2 hours in the office each day. For a 1 day test, please allow for 4 hours in the office that day.

## 2021-09-16 NOTE — LETTER
Patient:  Alysia Justice   YOB: 1960  Date of Visit: 9/16/2021      Dear MD Sammi Ledesma [id number]  Via Fax: [phone]:      Ms. Star Quevedo is a 64 yo F with a history of tobacco use, family history of early coronary artery disease, toxemia with pregnancy. Echo was normal. Loop demonstrated brief episodes of SVT. Since her last visit, her palpitations have been okay. She still gets these mostly in the evenings, but they are not bothersome. No associated symptoms. She has been more easily short of breath with exertion and occasional chest tightness that can last up to five minutes. She does some exercise with low impact, Deisy. She still is trying to work on stopping smoking and one pack lasts about three days. She notes that when she goes on work assignments where she is very busy she does not smoke at all during those times, so she does know that she can stop. She relates some difficulty not being able to stop possibly due to anxiety. She is compensated on exam with clear lungs and no lower extremity edema. Assessment and Plan:   1. Unstable angina. Shortness of breath, chest pain with typical and atypical features and multiple risk factors. Will proceed with a stress test for further evaluation. She cannot fully complete a treadmill as she is on a significant amount of Toprol for her SVT and will do this Lexiscan. 2. Paroxysmal supraventricular tachycardia. Stable on Toprol. Some breakthrough occasional palpitations, but it is not bothersome. Of note in the past, she had swelling and weight gain with Diltiazem. 3. Mixed hyperlipidemia. Tolerating statin. 4. Family history of early coronary artery disease. 5. Tobacco use. Cessation and encouraged this. If you have questions, please do not hesitate to call me.      Sincerely,      Vannesa Beck MD

## 2021-09-16 NOTE — PROGRESS NOTES
[address]: [place]  [phone]    History of Present Illness:  Ms. Lauren Ricardo is a 62 yo F with a history of tobacco use, family history of early coronary artery disease, toxemia with pregnancy. Echo was normal. Loop demonstrated brief episodes of SVT. Since her last visit, her palpitations have been okay. She still gets these mostly in the evenings, but they are not bothersome. No associated symptoms. She has been more easily short of breath with exertion and occasional chest tightness that can last up to five minutes. She does some exercise with low impact, Deisy. She still is trying to work on stopping smoking and one pack lasts about three days. She notes that when she goes on work assignments where she is very busy she does not smoke at all during those times, so she does know that she can stop. She relates some difficulty not being able to stop possibly due to anxiety. She is compensated on exam with clear lungs and no lower extremity edema. Assessment and Plan:   1. Unstable angina. Shortness of breath, chest pain with typical and atypical features and multiple risk factors. Will proceed with a stress test for further evaluation. She cannot fully complete a treadmill as she is on a significant amount of Toprol for her SVT and will do this Lexiscan. 2. Paroxysmal supraventricular tachycardia. Stable on Toprol. Some breakthrough occasional palpitations, but it is not bothersome. Of note in the past, she had swelling and weight gain with Diltiazem. 3. Mixed hyperlipidemia. Tolerating statin. 4. Family history of early coronary artery disease. 5. Tobacco use. Cessation and encouraged this. She  has a past medical history of Hyperlipemia, Hypertension, and Preeclampsia. All other systems negative except as above.      PE  Vitals:    09/16/21 0823   BP: 120/80   Pulse: 72   Resp: 16   SpO2: 96%   Weight: 175 lb (79.4 kg)   Height: 5' 2\" (1.575 m)    Body mass index is 32.01 kg/m². General appearance - alert, well appearing, and in no distress  Mental status - affect appropriate to mood  Eyes - sclera anicteric, moist mucous membranes  Neck - supple, no JVD  Chest - clear to auscultation, no wheezes, rales or rhonchi  Heart - normal rate, regular rhythm, normal S1, S2, no murmurs, rubs, clicks or gallops  Abdomen - soft, nontender, nondistended, no masses or organomegaly  Neurological no focal deficit  Extremities - peripheral pulses normal, no pedal edema      Recent Labs:  No results found for: CHOL, CHOLX, CHLST, CHOLV, 225914, HDL, HDLP, LDL, LDLC, DLDLP, TGLX, TRIGL, TRIGP, CHHD, CHHDX  Lab Results   Component Value Date/Time    Creatinine 0.87 02/08/2021 08:20 AM     Lab Results   Component Value Date/Time    BUN 13 02/08/2021 08:20 AM     Lab Results   Component Value Date/Time    Potassium 3.9 02/08/2021 08:20 AM     No results found for: HBA1C, MZY9LVFJ, FIN1VGPK  No results found for: HGBPOC, HGB, HGBP, HGBEXT, HGBEXT  No results found for: PLT, PLTEXT, PLTEXT    Reviewed:  Past Medical History:   Diagnosis Date    Hyperlipemia     Hypertension     Preeclampsia      Social History     Tobacco Use   Smoking Status Former Smoker   Smokeless Tobacco Never Used     Social History     Substance and Sexual Activity   Alcohol Use Yes    Alcohol/week: 3.3 standard drinks    Types: 4 Glasses of wine per week     Allergies   Allergen Reactions    Azithromycin Hives and Rash    Zocor [Simvastatin] Other (comments)     Heart palpitations       Current Outpatient Medications   Medication Sig    metoprolol succinate (TOPROL-XL) 100 mg tablet Take 1.5 Tabs by mouth daily.  furosemide (LASIX) 40 mg tablet TAKE 1 TABLET BY MOUTH EVERY DAY    atorvastatin (LIPITOR) 40 mg tablet TAKE 1 TABLET BY MOUTH EVERY DAY    biotin 10,000 mcg cap Take  by mouth.  cholecalciferol, vitamin D3, (VITAMIN D3 PO) Take  by mouth.     ATORVASTATIN CALCIUM (LIPITOR PO) Take  by mouth.   (Patient not taking: Reported on 9/16/2021)     No current facility-administered medications for this visit.        Fang Moyer MD  [facility] heart and Vascular [place]  [address] [facility] [address]  [address]

## 2021-09-22 ENCOUNTER — ANCILLARY PROCEDURE (OUTPATIENT)
Dept: CARDIOLOGY CLINIC | Age: 61
End: 2021-09-22
Payer: COMMERCIAL

## 2021-09-22 VITALS — BODY MASS INDEX: 32.2 KG/M2 | HEIGHT: 62 IN | WEIGHT: 175 LBS

## 2021-09-22 DIAGNOSIS — I47.1 PAROXYSMAL SVT (SUPRAVENTRICULAR TACHYCARDIA) (HCC): ICD-10-CM

## 2021-09-22 DIAGNOSIS — Z82.49 FAMILY HISTORY OF EARLY CAD: ICD-10-CM

## 2021-09-22 DIAGNOSIS — E78.2 MIXED HYPERLIPIDEMIA: ICD-10-CM

## 2021-09-22 DIAGNOSIS — I20.0 UNSTABLE ANGINA (HCC): ICD-10-CM

## 2021-09-22 DIAGNOSIS — Z72.0 TOBACCO USE: ICD-10-CM

## 2021-09-22 LAB
STRESS BASELINE DIAS BP: 80 MMHG
STRESS BASELINE HR: 48 BPM
STRESS BASELINE SYS BP: 126 MMHG
STRESS ESTIMATED WORKLOAD: 1 METS
STRESS EXERCISE DUR MIN: NORMAL
STRESS O2 SAT PEAK: 100 %
STRESS O2 SAT REST: 100 %
STRESS PEAK DIAS BP: 88 MMHG
STRESS PEAK SYS BP: 146 MMHG
STRESS PERCENT HR ACHIEVED: 52 %
STRESS POST PEAK HR: 83 BPM
STRESS RATE PRESSURE PRODUCT: NORMAL BPM*MMHG
STRESS TARGET HR: 161 BPM

## 2021-09-22 PROCEDURE — 78452 HT MUSCLE IMAGE SPECT MULT: CPT | Performed by: INTERNAL MEDICINE

## 2021-09-22 PROCEDURE — 93015 CV STRESS TEST SUPVJ I&R: CPT | Performed by: INTERNAL MEDICINE

## 2021-09-22 RX ORDER — AMINOPHYLLINE 25 MG/ML
100 INJECTION, SOLUTION INTRAVENOUS ONCE
Status: COMPLETED | OUTPATIENT
Start: 2021-09-22 | End: 2021-09-22

## 2021-09-22 RX ORDER — TETRAKIS(2-METHOXYISOBUTYLISOCYANIDE)COPPER(I) TETRAFLUOROBORATE 1 MG/ML
30 INJECTION, POWDER, LYOPHILIZED, FOR SOLUTION INTRAVENOUS ONCE
Status: COMPLETED | OUTPATIENT
Start: 2021-09-22 | End: 2021-09-22

## 2021-09-22 RX ORDER — TETRAKIS(2-METHOXYISOBUTYLISOCYANIDE)COPPER(I) TETRAFLUOROBORATE 1 MG/ML
10 INJECTION, POWDER, LYOPHILIZED, FOR SOLUTION INTRAVENOUS ONCE
Status: COMPLETED | OUTPATIENT
Start: 2021-09-22 | End: 2021-09-22

## 2021-09-22 RX ADMIN — TETRAKIS(2-METHOXYISOBUTYLISOCYANIDE)COPPER(I) TETRAFLUOROBORATE 8.8 MILLICURIE: 1 INJECTION, POWDER, LYOPHILIZED, FOR SOLUTION INTRAVENOUS at 12:55

## 2021-09-22 RX ADMIN — AMINOPHYLLINE 100 MG: 25 INJECTION, SOLUTION INTRAVENOUS at 14:22

## 2021-09-22 RX ADMIN — TETRAKIS(2-METHOXYISOBUTYLISOCYANIDE)COPPER(I) TETRAFLUOROBORATE 25.7 MILLICURIE: 1 INJECTION, POWDER, LYOPHILIZED, FOR SOLUTION INTRAVENOUS at 14:15

## 2021-09-23 ENCOUNTER — TELEPHONE (OUTPATIENT)
Dept: CARDIOLOGY CLINIC | Age: 61
End: 2021-09-23

## 2021-09-23 NOTE — TELEPHONE ENCOUNTER
Patient called in to get stress results. Pt was informed of normal stress test. Pt verbalized understanding and denies any further questions at this time.

## 2021-11-05 RX ORDER — METOPROLOL SUCCINATE 100 MG/1
150 TABLET, EXTENDED RELEASE ORAL DAILY
Qty: 135 TABLET | Refills: 1 | Status: SHIPPED | OUTPATIENT
Start: 2021-11-05 | End: 2022-01-26

## 2021-11-05 NOTE — TELEPHONE ENCOUNTER
Requested Prescriptions     Signed Prescriptions Disp Refills    metoprolol succinate (TOPROL-XL) 100 mg tablet 135 Tablet 1     Sig: Take 1.5 Tablets by mouth daily.      Authorizing Provider: Elisabeth Kay     Ordering User: Ирина Gallagher       Last office visit 9/16/2021    Per Dr. Dileep Thomason   Date Time Provider Maryam Cristobal   3/16/2022  8:20 AM MD EMELINA Donahue AMB

## 2022-01-26 RX ORDER — CARVEDILOL 25 MG/1
25 TABLET ORAL 2 TIMES DAILY WITH MEALS
Qty: 180 TABLET | Refills: 1 | Status: SHIPPED | OUTPATIENT
Start: 2022-01-26 | End: 2022-07-12 | Stop reason: SDUPTHER

## 2022-01-26 NOTE — TELEPHONE ENCOUNTER
Requested Prescriptions     Signed Prescriptions Disp Refills    carvediloL (COREG) 25 mg tablet 180 Tablet 1     Sig: Take 1 Tablet by mouth two (2) times daily (with meals).      Authorizing Provider: Jodi Woody     Ordering User: Carrie Gustafson       Per Dr. Dylan Cuevas   Date Time Provider Maryam Cristobal   3/16/2022  8:20 AM MD EMELINA Hoang AMB

## 2022-03-18 ENCOUNTER — OFFICE VISIT (OUTPATIENT)
Dept: CARDIOLOGY CLINIC | Age: 62
End: 2022-03-18
Payer: COMMERCIAL

## 2022-03-18 VITALS
BODY MASS INDEX: 33.16 KG/M2 | SYSTOLIC BLOOD PRESSURE: 120 MMHG | OXYGEN SATURATION: 98 % | HEIGHT: 62 IN | HEART RATE: 64 BPM | WEIGHT: 180.2 LBS | RESPIRATION RATE: 14 BRPM | DIASTOLIC BLOOD PRESSURE: 80 MMHG

## 2022-03-18 DIAGNOSIS — Z82.49 FAMILY HISTORY OF EARLY CAD: ICD-10-CM

## 2022-03-18 DIAGNOSIS — E78.2 MIXED HYPERLIPIDEMIA: ICD-10-CM

## 2022-03-18 DIAGNOSIS — I47.1 PAROXYSMAL SVT (SUPRAVENTRICULAR TACHYCARDIA) (HCC): Primary | ICD-10-CM

## 2022-03-18 DIAGNOSIS — Z87.891 HISTORY OF TOBACCO USE: ICD-10-CM

## 2022-03-18 PROCEDURE — 93000 ELECTROCARDIOGRAM COMPLETE: CPT | Performed by: INTERNAL MEDICINE

## 2022-03-18 PROCEDURE — 99214 OFFICE O/P EST MOD 30 MIN: CPT | Performed by: INTERNAL MEDICINE

## 2022-03-18 RX ORDER — LANOLIN ALCOHOL/MO/W.PET/CERES
1000 CREAM (GRAM) TOPICAL DAILY
COMMUNITY

## 2022-03-18 NOTE — LETTER
Patient:  Keith Everett   YOB: 1960  Date of Visit: 3/18/2022      Dear MD Sammi Puckett [id number]  Via Fax: [phone]:    Ms. [facility] is a 62 yo F with a history of tobacco use, family history of early coronary artery disease, toxemia with pregnancy. Echo was normal. Loop demonstrated brief episodes of SVT. Since her last visit, overall she has been doing okay. Of note, she was having fatigue and side effects with Toprol, but when she switched to Coreg this made a big difference. She had more energy and less fatigue. On her last visit, obtained a stress test that demonstrated no reversible ischemia. She was able to stop smoking in January and commended her on this. She is getting back to exercising regularly. She denies any exertional chest pain, shortness of breath. No significant palpitations or even breakthrough palpitations. She is compensated on exam with clear lungs and no lower extremity edema. Assessment and Plan:   1. Shortness of breath. This is not an issue at this time. Her stress test was normal.  Encouraged continued exercise and activity and she is working on her weight. Will have her follow back in six months. 2. Paroxysmal supraventricular tachycardia. Stable on Coreg. She did have side effects with Toprol and would avoid this in the future. She had GI bleeding with Diltiazem. 3. Tobacco use. Encouraged continued cessation. 4. Family history of early coronary artery disease. 5. Mixed hyperlipidemia. Tolerating statin. If you have questions, please do not hesitate to call me.      Sincerely,      Kennedy Kelsey MD

## 2022-03-18 NOTE — PROGRESS NOTES
[address]:   [phone]    History of Present Illness:  Ms. Ozzie Hartman is a 62 yo F with a history of tobacco use, family history of early coronary artery disease, toxemia with pregnancy. Echo was normal. Loop demonstrated brief episodes of SVT. Since her last visit, overall she has been doing okay. Of note, she was having fatigue and side effects with Toprol, but when she switched to Coreg this made a big difference. She had more energy and less fatigue. On her last visit, obtained a stress test that demonstrated no reversible ischemia. She was able to stop smoking in January and commended her on this. She is getting back to exercising regularly. She denies any exertional chest pain, shortness of breath. No significant palpitations or even breakthrough palpitations. She is compensated on exam with clear lungs and no lower extremity edema. Assessment and Plan:   1. Shortness of breath. This is not an issue at this time. Her stress test was normal.  Encouraged continued exercise and activity and she is working on her weight. Will have her follow back in six months. 2. Paroxysmal supraventricular tachycardia. Stable on Coreg. She did have side effects with Toprol and would avoid this in the future. She had swelling with Diltiazem. 3. Tobacco use. Encouraged continued cessation. 4. Family history of early coronary artery disease. 5. Mixed hyperlipidemia. Tolerating statin. She  has a past medical history of Hyperlipemia, Hypertension, and Preeclampsia. All other systems negative except as above. PE  Vitals:    03/18/22 1340   BP: 120/80   Pulse: 64   Resp: 14   SpO2: 98%   Weight: 180 lb 3.2 oz (81.7 kg)   Height: 5' 2\" (1.575 m)    Body mass index is 32.96 kg/m².    General appearance - alert, well appearing, and in no distress  Mental status - affect appropriate to mood  Eyes - sclera anicteric, moist mucous membranes  Neck - supple, no JVD  Chest - clear to auscultation, no wheezes, rales or rhonchi  Heart - normal rate, regular rhythm, normal S1, S2, no murmurs, rubs, clicks or gallops  Abdomen - soft, nontender, nondistended, no masses or organomegaly  Neurological no focal deficit  Extremities - peripheral pulses normal, no pedal edema      Recent Labs:  No results found for: CHOL, CHOLX, CHLST, CHOLV, 552359, HDL, HDLP, LDL, LDLC, DLDLP, TGLX, TRIGL, TRIGP, CHHD, CHHDX  Lab Results   Component Value Date/Time    Creatinine 0.87 02/08/2021 08:20 AM     Lab Results   Component Value Date/Time    BUN 13 02/08/2021 08:20 AM     Lab Results   Component Value Date/Time    Potassium 3.9 02/08/2021 08:20 AM     No results found for: HBA1C, VAK0JQFA, HFB2FPJE  No results found for: HGBPOC, HGB, HGBP, HGBEXT, HGBEXT  No results found for: PLT, PLTEXT, PLTEXT    Reviewed:  Past Medical History:   Diagnosis Date    Hyperlipemia     Hypertension     Preeclampsia      Social History     Tobacco Use   Smoking Status Former Smoker   Smokeless Tobacco Never Used     Social History     Substance and Sexual Activity   Alcohol Use Yes    Alcohol/week: 3.3 standard drinks    Types: 4 Glasses of wine per week    Comment: Daily     Allergies   Allergen Reactions    Azithromycin Hives and Rash    Zocor [Simvastatin] Other (comments)     Heart palpitations       Current Outpatient Medications   Medication Sig    cyanocobalamin (Vitamin B-12) 1,000 mcg tablet Take 1,000 mcg by mouth daily.  carvediloL (COREG) 25 mg tablet Take 1 Tablet by mouth two (2) times daily (with meals).  furosemide (LASIX) 40 mg tablet TAKE 1 TABLET BY MOUTH EVERY DAY    atorvastatin (LIPITOR) 40 mg tablet TAKE 1 TABLET BY MOUTH EVERY DAY    biotin 10,000 mcg cap Take  by mouth.  cholecalciferol, vitamin D3, (VITAMIN D3 PO) Take  by mouth. (Patient not taking: Reported on 3/18/2022)     No current facility-administered medications for this visit.        Art Bradley MD  [facility] heart and Vascular [place]  [facility] [address], [address], [address]

## 2022-07-11 ENCOUNTER — PATIENT MESSAGE (OUTPATIENT)
Dept: CARDIOLOGY CLINIC | Age: 62
End: 2022-07-11

## 2022-07-11 DIAGNOSIS — R00.2 HEART PALPITATIONS: ICD-10-CM

## 2022-07-11 DIAGNOSIS — I47.1 PAROXYSMAL SVT (SUPRAVENTRICULAR TACHYCARDIA) (HCC): Primary | ICD-10-CM

## 2022-07-12 RX ORDER — CARVEDILOL 25 MG/1
25 TABLET ORAL 2 TIMES DAILY WITH MEALS
Qty: 180 TABLET | Refills: 1 | Status: SHIPPED | OUTPATIENT
Start: 2022-07-12

## 2022-07-12 NOTE — TELEPHONE ENCOUNTER
Refilled per LILIANA nuno MD    Future Appointments   Date Time Provider Maryam Susu   9/16/2022  1:40 PM MD EMELINA Holland AMB

## 2022-10-28 ENCOUNTER — OFFICE VISIT (OUTPATIENT)
Dept: CARDIOLOGY CLINIC | Age: 62
End: 2022-10-28
Payer: COMMERCIAL

## 2022-10-28 VITALS
HEIGHT: 62 IN | DIASTOLIC BLOOD PRESSURE: 70 MMHG | SYSTOLIC BLOOD PRESSURE: 110 MMHG | OXYGEN SATURATION: 99 % | WEIGHT: 189 LBS | BODY MASS INDEX: 34.78 KG/M2 | RESPIRATION RATE: 16 BRPM | HEART RATE: 61 BPM

## 2022-10-28 DIAGNOSIS — I47.1 PAROXYSMAL SVT (SUPRAVENTRICULAR TACHYCARDIA) (HCC): Primary | ICD-10-CM

## 2022-10-28 DIAGNOSIS — Z01.810 PREOP CARDIOVASCULAR EXAM: ICD-10-CM

## 2022-10-28 PROCEDURE — 99213 OFFICE O/P EST LOW 20 MIN: CPT | Performed by: INTERNAL MEDICINE

## 2022-10-28 NOTE — PROGRESS NOTES
[address]:   [phone]    History of Present Illness:  Ms. Myles Schofield is a [address] yo F with a history of tobacco use, family history of early coronary artery disease, toxemia with pregnancy. Echo was normal. Loop demonstrated brief episodes of SVT. Stress test was normal.    Since her last visit overall she has been doing well. No significant palpitations and she has been stable on Coreg. She is getting in regular exercise and activity. She is compensated on exam clear lungs and no lower extremity edema. Assessment and Plan:   1. Shortness of breath. Stable and compensated. .  Her stress test was normal.  Encouraged continued exercise and activity and she is working on her weight. Will have her follow back in 1 yr.  2.  Preop cardiac evaluation. She stable cardiac wise and low risk for cardiac complications. We will give her a letter for upcoming surgery. 3. Paroxysmal supraventricular tachycardia. Stable on Coreg. She did have side effects with Toprol and would avoid this in the future. She had swelling with Diltiazem. 4. Tobacco use. Encouraged continued cessation. 5. Family history of early coronary artery disease. 6. Mixed hyperlipidemia. Tolerating statin. She  has a past medical history of Hyperlipemia, Hypertension, and Preeclampsia. All other systems negative except as above. PE  Vitals:    10/28/22 1534   BP: 110/70   Pulse: 61   Resp: 16   SpO2: 99%   Weight: 189 lb (85.7 kg)   Height: 5' 2\" (1.575 m)    Body mass index is 34.57 kg/m².    General appearance - alert, well appearing, and in no distress  Mental status - affect appropriate to mood  Eyes - sclera anicteric, moist mucous membranes  Neck - supple, no JVD  Chest - clear to auscultation, no wheezes, rales or rhonchi  Heart - normal rate, regular rhythm, normal S1, S2, no murmurs, rubs, clicks or gallops  Abdomen - soft, nontender, nondistended, no masses or organomegaly  Neurological no focal deficit  Extremities - peripheral pulses normal, no pedal edema      Recent Labs:  No results found for: CHOL, CHOLX, CHLST, CHOLV, 970610, HDL, HDLP, LDL, LDLC, DLDLP, TGLX, TRIGL, TRIGP, CHHD, CHHDX  Lab Results   Component Value Date/Time    Creatinine 0.87 02/08/2021 08:20 AM     Lab Results   Component Value Date/Time    BUN 13 02/08/2021 08:20 AM     Lab Results   Component Value Date/Time    Potassium 3.9 02/08/2021 08:20 AM     No results found for: HBA1C, FXT2YXNL, MAV7TSZY  No results found for: HGBPOC, HGB, HGBP, HGBEXT, HGBEXT  No results found for: PLT, PLTEXT, PLTEXT    Reviewed:  Past Medical History:   Diagnosis Date    Hyperlipemia     Hypertension     Preeclampsia      Social History     Tobacco Use   Smoking Status Former   Smokeless Tobacco Never     Social History     Substance and Sexual Activity   Alcohol Use Yes    Alcohol/week: 3.3 standard drinks    Types: 4 Glasses of wine per week    Comment: Daily     Allergies   Allergen Reactions    Azithromycin Hives and Rash    Zocor [Simvastatin] Other (comments)     Heart palpitations       Current Outpatient Medications   Medication Sig    carvediloL (COREG) 25 mg tablet Take 1 Tablet by mouth two (2) times daily (with meals). furosemide (LASIX) 40 mg tablet TAKE 1 TABLET BY MOUTH EVERY DAY    atorvastatin (LIPITOR) 40 mg tablet TAKE 1 TABLET BY MOUTH EVERY DAY    cyanocobalamin (Vitamin B-12) 1,000 mcg tablet Take 1,000 mcg by mouth daily. biotin 10,000 mcg cap Take  by mouth. cholecalciferol, vitamin D3, (VITAMIN D3 PO) Take  by mouth. (Patient not taking: Reported on 3/18/2022)     No current facility-administered medications for this visit.        Celestine De Souza MD  [facility] heart and Vascular [place]  [address], [address] [facility] [address]  [address]

## 2022-10-28 NOTE — LETTER
10/28/2022     Ms. Eden Vargas [address] [place] [address]          To Whom It May Concern:    Ms. Amalia Muir is currently under the care of [address] [place] [address] MARCIA. From a cardiac standpoint she is stable and low risk for surgery. If there are any questions or concerns please contact our office.             Sincerely,        Osito Montemayor MD

## 2022-10-28 NOTE — LETTER
Patient:  Li Llamas   YOB: 1960  Date of Visit: 10/28/2022      Dear Finesse Myles MD  [address] [facility] [address]  Via Fax: [phone]:     Ms. Jane Puente is a 62 yo F with a history of tobacco use, family history of early coronary artery disease, toxemia with pregnancy. Echo was normal. Loop demonstrated brief episodes of SVT. Stress test was normal.    Since her last visit overall she has been doing well. No significant palpitations and she has been stable on Coreg. She is getting in regular exercise and activity. She is compensated on exam clear lungs and no lower extremity edema. Assessment and Plan:   1. Shortness of breath. Stable and compensated. .  Her stress test was normal.  Encouraged continued exercise and activity and she is working on her weight. Will have her follow back in 1 yr.  2.  Preop cardiac evaluation. She stable cardiac wise and low risk for cardiac complications. We will give her a letter for upcoming surgery. 3. Paroxysmal supraventricular tachycardia. Stable on Coreg. She did have side effects with Toprol and would avoid this in the future. She had swelling with Diltiazem. 4. Tobacco use. Encouraged continued cessation. 5. Family history of early coronary artery disease. 6. Mixed hyperlipidemia. Tolerating statin. If you have questions, please do not hesitate to call me.      Sincerely,      Rosslyn Kawasaki, MD

## 2022-11-07 ENCOUNTER — CLINICAL SUPPORT (OUTPATIENT)
Dept: CARDIOLOGY CLINIC | Age: 62
End: 2022-11-07
Payer: COMMERCIAL

## 2022-11-07 VITALS
RESPIRATION RATE: 16 BRPM | HEIGHT: 62 IN | OXYGEN SATURATION: 95 % | SYSTOLIC BLOOD PRESSURE: 128 MMHG | BODY MASS INDEX: 34.78 KG/M2 | DIASTOLIC BLOOD PRESSURE: 70 MMHG | HEART RATE: 66 BPM | WEIGHT: 189 LBS

## 2022-11-07 DIAGNOSIS — I47.1 PAROXYSMAL SVT (SUPRAVENTRICULAR TACHYCARDIA) (HCC): Primary | ICD-10-CM

## 2022-11-07 PROCEDURE — 93000 ELECTROCARDIOGRAM COMPLETE: CPT | Performed by: NURSE PRACTITIONER

## 2022-11-07 NOTE — PROGRESS NOTES
Reviewed ECG, NSR with non-specific ST-T wave changes, no ischemic changes  OK to proceed with surgery   Will fax ECG to surgeon's office today

## 2023-01-19 DIAGNOSIS — I47.1 PAROXYSMAL SVT (SUPRAVENTRICULAR TACHYCARDIA) (HCC): ICD-10-CM

## 2023-01-19 DIAGNOSIS — R00.2 HEART PALPITATIONS: ICD-10-CM

## 2023-01-20 DIAGNOSIS — I47.1 PAROXYSMAL SVT (SUPRAVENTRICULAR TACHYCARDIA) (HCC): ICD-10-CM

## 2023-01-20 DIAGNOSIS — R00.2 HEART PALPITATIONS: ICD-10-CM

## 2023-01-20 RX ORDER — CARVEDILOL 25 MG/1
25 TABLET ORAL 2 TIMES DAILY WITH MEALS
Qty: 180 TABLET | Refills: 0 | OUTPATIENT
Start: 2023-01-20

## 2023-01-20 RX ORDER — CARVEDILOL 25 MG/1
25 TABLET ORAL 2 TIMES DAILY WITH MEALS
Qty: 180 TABLET | Refills: 2 | Status: SHIPPED | OUTPATIENT
Start: 2023-01-20

## 2023-01-20 NOTE — TELEPHONE ENCOUNTER
Requested Prescriptions     Signed Prescriptions Disp Refills    carvediloL (COREG) 25 mg tablet 180 Tablet 2     Sig: Take 1 Tablet by mouth two (2) times daily (with meals).      Authorizing Provider: Ileana Mcclendon     Ordering User: Elizabeth Mcgregor     Verbal order per Dr. Shay Springer.    Future Appointments   Date Time Provider Maryam Cristobal   10/30/2023  2:20 PM MD EMELINA Cortez AMB

## 2023-05-19 RX ORDER — CARVEDILOL 25 MG/1
25 TABLET ORAL 2 TIMES DAILY WITH MEALS
COMMUNITY
Start: 2023-01-20

## 2023-05-19 RX ORDER — ATORVASTATIN CALCIUM 40 MG/1
1 TABLET, FILM COATED ORAL DAILY
COMMUNITY
Start: 2020-08-15

## 2023-05-19 RX ORDER — FUROSEMIDE 40 MG/1
1 TABLET ORAL DAILY
COMMUNITY
Start: 2021-03-01

## 2023-05-19 RX ORDER — BIOTIN 10000 MCG
CAPSULE ORAL
COMMUNITY

## 2023-09-28 ENCOUNTER — PATIENT MESSAGE (OUTPATIENT)
Age: 63
End: 2023-09-28

## 2023-09-28 NOTE — TELEPHONE ENCOUNTER
From: Ewa Howell  To: Dr. Angel Mireles: 9/28/2023 8:19 AM EDT  Subject: Appointment    My appointment is scheduled for October 30th @ 2:40P. I am scheduled to be out of town for business on that day. I have the following openings in my travel schedule in November: week of November 6th and the week after Thanksgiving. Prefer mid morning or mid afternoon as I am about 45-50 minutes from your office. Please feel free to call at [phone] if you need to speak with me.

## 2023-10-24 ENCOUNTER — OFFICE VISIT (OUTPATIENT)
Age: 63
End: 2023-10-24

## 2023-10-24 VITALS
WEIGHT: 166.8 LBS | BODY MASS INDEX: 30.69 KG/M2 | DIASTOLIC BLOOD PRESSURE: 80 MMHG | SYSTOLIC BLOOD PRESSURE: 128 MMHG | HEIGHT: 62 IN | HEART RATE: 60 BPM | OXYGEN SATURATION: 97 %

## 2023-10-24 DIAGNOSIS — Z82.49 FAMILY HISTORY OF ISCHEMIC HEART DISEASE AND OTHER DISEASES OF THE CIRCULATORY SYSTEM: ICD-10-CM

## 2023-10-24 DIAGNOSIS — I47.10 SUPRAVENTRICULAR TACHYCARDIA: Primary | ICD-10-CM

## 2023-10-24 DIAGNOSIS — Z82.49 FAMILY HISTORY OF EARLY CAD: ICD-10-CM

## 2023-10-24 DIAGNOSIS — E78.2 MIXED HYPERLIPIDEMIA: ICD-10-CM

## 2023-10-24 RX ORDER — OMEPRAZOLE 20 MG/1
CAPSULE, DELAYED RELEASE ORAL
COMMUNITY
Start: 2023-09-25

## 2023-10-24 RX ORDER — CARVEDILOL 25 MG/1
25 TABLET ORAL 2 TIMES DAILY WITH MEALS
Qty: 180 TABLET | Refills: 3 | Status: SHIPPED | OUTPATIENT
Start: 2023-10-24

## 2023-10-24 RX ORDER — FLUTICASONE PROPIONATE 50 MCG
SPRAY, SUSPENSION (ML) NASAL
COMMUNITY
Start: 2023-09-19

## 2023-10-24 ASSESSMENT — PATIENT HEALTH QUESTIONNAIRE - PHQ9
SUM OF ALL RESPONSES TO PHQ QUESTIONS 1-9: 0
1. LITTLE INTEREST OR PLEASURE IN DOING THINGS: 0
SUM OF ALL RESPONSES TO PHQ QUESTIONS 1-9: 0
SUM OF ALL RESPONSES TO PHQ9 QUESTIONS 1 & 2: 0
2. FEELING DOWN, DEPRESSED OR HOPELESS: 0

## 2024-01-04 ENCOUNTER — HOSPITAL ENCOUNTER (OUTPATIENT)
Facility: HOSPITAL | Age: 64
Discharge: HOME | End: 2024-01-04
Payer: COMMERCIAL

## 2024-01-04 VITALS — BODY MASS INDEX: 30.73 KG/M2 | HEIGHT: 62 IN | WEIGHT: 167 LBS

## 2024-01-04 DIAGNOSIS — Z12.31 VISIT FOR SCREENING MAMMOGRAM: ICD-10-CM

## 2024-01-04 PROCEDURE — 77063 BREAST TOMOSYNTHESIS BI: CPT

## 2024-01-09 ENCOUNTER — TELEPHONE (OUTPATIENT)
Age: 64
End: 2024-01-09

## 2024-01-09 NOTE — TELEPHONE ENCOUNTER
Called patient left a voice message to schedule a consultation.  Your provider, Dr.John Gilmore  referred you to our office for a Sleep Medicine Consultation appointment. This is a 20-30 minute daytime office visit allowing you to speak with the physician to determine the medical necessity of an overnight sleep study. We were unable to reach you or we see that you have not yet scheduled your appointment with us. Please give us a call back at [phone] to schedule. If you have questions about the nature of this referral, please contact your referring physician directly. If you have already scheduled with us, kindly disregard this message. Thank you for considering us for your care.

## 2024-10-06 ENCOUNTER — PATIENT MESSAGE (OUTPATIENT)
Age: 64
End: 2024-10-06

## 2024-10-06 DIAGNOSIS — I47.10 SUPRAVENTRICULAR TACHYCARDIA (HCC): Primary | ICD-10-CM

## 2024-10-06 DIAGNOSIS — R00.2 PALPITATIONS: ICD-10-CM

## 2024-10-07 ENCOUNTER — TELEPHONE (OUTPATIENT)
Age: 64
End: 2024-10-07

## 2024-10-07 NOTE — TELEPHONE ENCOUNTER
Enrolled with [employer] - Ordered and being shipped to patient's home address on file.  ETA within 5-7 business days.         Loop  Received: Today  Rhonda Hoffmann, RN  Leah Mancilla  Please order a 7 day loop monitor for palpitations with sensitive stickers per Dr. Das. Thanks

## 2024-10-17 RX ORDER — CARVEDILOL 25 MG/1
25 TABLET ORAL 2 TIMES DAILY WITH MEALS
Qty: 180 TABLET | Refills: 0 | Status: SHIPPED | OUTPATIENT
Start: 2024-10-17

## 2024-10-17 NOTE — TELEPHONE ENCOUNTER
Requested Prescriptions     Signed Prescriptions Disp Refills    carvedilol (COREG) 25 MG tablet 180 tablet 0     Sig: Take 1 tablet by mouth 2 times daily (with meals)     Authorizing Provider: CLEO ROWLEY     Ordering User: CARMELINA RAMÍREZ MD    Future Appointments   Date Time Provider Department Center   10/29/2024  9:40 AM Cleo Rowley MD [facility] AMB

## 2024-10-28 ENCOUNTER — TELEPHONE (OUTPATIENT)
Age: 64
End: 2024-10-28

## 2024-10-28 NOTE — TELEPHONE ENCOUNTER
----- Message from Dr. Jeremy Rowley MD sent at 10/28/2024  8:42 AM EDT -----  Please let pt know her heart monitor was overall ok. She did have rare to occasional PVCs (2%). She is already on coreg and would continue this. Hydrated, minimize caffeine intake. Keep scheduled followup. thx

## 2024-10-29 ENCOUNTER — OFFICE VISIT (OUTPATIENT)
Age: 64
End: 2024-10-29

## 2024-10-29 VITALS
HEIGHT: 62 IN | SYSTOLIC BLOOD PRESSURE: 128 MMHG | OXYGEN SATURATION: 96 % | DIASTOLIC BLOOD PRESSURE: 80 MMHG | WEIGHT: 159.4 LBS | BODY MASS INDEX: 29.33 KG/M2 | HEART RATE: 53 BPM

## 2024-10-29 DIAGNOSIS — I47.10 PAROXYSMAL SVT (SUPRAVENTRICULAR TACHYCARDIA) (HCC): Primary | ICD-10-CM

## 2024-10-29 DIAGNOSIS — E78.2 MIXED HYPERLIPIDEMIA: ICD-10-CM

## 2024-10-29 DIAGNOSIS — G47.9 SLEEP DISTURBANCE: ICD-10-CM

## 2024-10-29 DIAGNOSIS — R00.2 PALPITATIONS: ICD-10-CM

## 2024-10-29 DIAGNOSIS — Z82.49 FAMILY HISTORY OF EARLY CAD: ICD-10-CM

## 2024-10-29 ASSESSMENT — PATIENT HEALTH QUESTIONNAIRE - PHQ9
SUM OF ALL RESPONSES TO PHQ QUESTIONS 1-9: 0
1. LITTLE INTEREST OR PLEASURE IN DOING THINGS: NOT AT ALL
SUM OF ALL RESPONSES TO PHQ QUESTIONS 1-9: 0
SUM OF ALL RESPONSES TO PHQ QUESTIONS 1-9: 0
2. FEELING DOWN, DEPRESSED OR HOPELESS: NOT AT ALL
SUM OF ALL RESPONSES TO PHQ QUESTIONS 1-9: 0
SUM OF ALL RESPONSES TO PHQ9 QUESTIONS 1 & 2: 0

## 2024-10-29 NOTE — PROGRESS NOTES
[address]:   [phone]    History of Present Illness:  Ms. Barboza is a 63 yo F with a history of tobacco use, family history of early coronary artery disease, toxemia with pregnancy. Echo was normal. Loop demonstrated brief episodes of SVT. Stress test was normal.    Since the last visit overall she has been doing okay.  She did have some palpitations and a subsequent heart monitor demonstrated occasional PVC's 2% of the time.  She is already on a beta blocker so no changes were made.  Her average heart rate was 56 beats per minute.  She is staying active, exercising four times per week at the gym.  She got her  certification.  No exertional chest pain.  Breathing has been stable, just rare palpitations.  She has noticed some difficulty with sleep and wakes up multiple times at night.  She is compensated on exam with clear lungs and no lower extremity edema.  EKG was sinus bradycardia, heart rate 48, but again her average heart rate on the monitor was 56.    Soc hx. Tobacco quit  Fam hx. GF paternal 55 CAD, father CABG/MI.  Assessment/Plan:   1. Paroxysmal SVT, stable on Coreg.  She did have side-effects with Toprol, would avoid that, and swelling with Diltiazem.  Recent heart monitor did demonstrate PVC's 2% of the time and would continue the Coreg without any change.    2. Bradycardia.  This is partly secondary to her beta blocker.    3. Sleep disturbance.  Will obtain a sleep study.   4. Tobacco use.  Continue with cessation.   5. Family history of early coronary artery disease.    6. Mixed hyperlipidemia.  Tolerating statin.          She  has a past medical history of Hyperlipemia, Hypertension, and Preeclampsia.    All other systems negative except as above.     PE  Vitals:    10/29/24 0947   BP: 128/80   Site: Left Upper Arm   Position: Sitting   Cuff Size: Medium Adult   Pulse: 53   SpO2: 96%   Weight: 72.3 kg (159 lb 6.4 oz)   Height: 1.575 m (5' 2\")      Body mass

## 2024-11-16 ENCOUNTER — PATIENT MESSAGE (OUTPATIENT)
Age: 64
End: 2024-11-16

## 2024-11-16 DIAGNOSIS — G47.9 SLEEP DISTURBANCE: Primary | ICD-10-CM

## 2025-01-20 RX ORDER — CARVEDILOL 25 MG/1
25 TABLET ORAL 2 TIMES DAILY WITH MEALS
Qty: 180 TABLET | Refills: 2 | Status: SHIPPED | OUTPATIENT
Start: 2025-01-20

## 2025-01-20 NOTE — TELEPHONE ENCOUNTER
Requested Prescriptions     Signed Prescriptions Disp Refills    carvedilol (COREG) 25 MG tablet 180 tablet 2     Sig: Take 1 tablet by mouth 2 times daily (with meals)     Authorizing Provider: JEREMY ROWLEY     Ordering User: CARMELINA RAMÍREZ MD    Future Appointments   Date Time Provider Department Center   1/24/2025  3:30 PM SPT [facility] 1 SPT[facility]   3/13/2025 10:20 AM Doug Moore MD [facility] AMB   10/30/2025  9:40 AM Jeremy Rowley MD [facility] AMB

## 2025-01-24 ENCOUNTER — TRANSCRIBE ORDERS (OUTPATIENT)
Facility: HOSPITAL | Age: 65
End: 2025-01-24

## 2025-01-24 ENCOUNTER — HOSPITAL ENCOUNTER (OUTPATIENT)
Facility: HOSPITAL | Age: 65
Discharge: HOME | End: 2025-01-27
Payer: COMMERCIAL

## 2025-01-24 DIAGNOSIS — Z12.31 VISIT FOR SCREENING MAMMOGRAM: ICD-10-CM

## 2025-01-24 DIAGNOSIS — Z12.31 VISIT FOR SCREENING MAMMOGRAM: Primary | ICD-10-CM

## 2025-01-24 PROCEDURE — 77063 BREAST TOMOSYNTHESIS BI: CPT

## 2025-03-06 ENCOUNTER — TELEPHONE (OUTPATIENT)
Age: 65
End: 2025-03-06

## 2025-03-06 NOTE — TELEPHONE ENCOUNTER
Provider identified that he will need to be out of the office next week and this affects patients appointment on 3/13/25. Left voicemail to reschedule.     Sent [employer] Message.

## 2025-07-22 RX ORDER — CARVEDILOL 25 MG/1
25 TABLET ORAL 2 TIMES DAILY WITH MEALS
Qty: 180 TABLET | Refills: 1 | Status: SHIPPED | OUTPATIENT
Start: 2025-07-22

## 2025-07-22 NOTE — TELEPHONE ENCOUNTER
Requested Prescriptions     Signed Prescriptions Disp Refills    carvedilol (COREG) 25 MG tablet 180 tablet 1     Sig: TAKE ONE TABLET BY MOUTH TWICE DAILY with meals     Authorizing Provider: CLEO ROWLEY     Ordering User: CARMELINA RAMÍREZ MD    Future Appointments   Date Time Provider Department Center   10/30/2025 10:00 AM Cleo Rowley MD [facility]